# Patient Record
Sex: FEMALE | Employment: OTHER | URBAN - METROPOLITAN AREA
[De-identification: names, ages, dates, MRNs, and addresses within clinical notes are randomized per-mention and may not be internally consistent; named-entity substitution may affect disease eponyms.]

---

## 2020-01-06 ENCOUNTER — NEW PATIENT (OUTPATIENT)
Dept: URBAN - METROPOLITAN AREA CLINIC 11 | Facility: CLINIC | Age: 75
End: 2020-01-06

## 2020-01-06 ASSESSMENT — TONOMETRY
OD_IOP_MMHG: 16
OS_IOP_MMHG: 18

## 2020-01-06 ASSESSMENT — VISUAL ACUITY
OD_CC: 20/30-2
OS_CC: 20/25-1

## 2020-01-06 NOTE — PATIENT DISCUSSION
Mild edema seen today.  Will defer treatment now. Discussed the possibility of focal laser treatment in the right eye in the future.  Patient understands condition.

## 2020-06-25 ENCOUNTER — FOLLOW UP (OUTPATIENT)
Dept: URBAN - METROPOLITAN AREA CLINIC 11 | Facility: CLINIC | Age: 75
End: 2020-06-25

## 2020-06-25 ASSESSMENT — TONOMETRY
OS_IOP_MMHG: 16
OD_IOP_MMHG: 13

## 2020-06-25 ASSESSMENT — VISUAL ACUITY
OS_CC: 20/30-2
OD_CC: 20/30-2

## 2020-06-25 NOTE — PROCEDURE NOTE: CLINICAL
PROCEDURE NOTE: Focal Laser, Retina OD. Diagnosis: Diabetes, Type II with Ocular Complications. Anesthesia: Topical. Prior to focal laser, risks/benefits/alternatives to laser discussed including loss of vision and patient wished to proceed. Spot size: * um. Power: * mW. Number of pulses: *. Patient tolerated procedure well. There were no complications. Post procedure instructions given. Tran Borja

## 2022-06-21 NOTE — PATIENT DISCUSSION
IOP elevated slightly, but did not use med this am. Consider Vyzulta if IOP not adequate. Recommend using latanoprost in AM rather than PM for compliance improvement.

## 2022-07-03 RX ORDER — SIMVASTATIN 40 MG
TABLET ORAL
COMMUNITY

## 2022-07-03 RX ORDER — LISINOPRIL 5 MG/1
TABLET ORAL
COMMUNITY

## 2022-07-03 RX ORDER — OMEPRAZOLE 40 MG/1
1 CAPSULE, DELAYED RELEASE ORAL
COMMUNITY

## 2022-07-03 RX ORDER — CANAGLIFLOZIN 300 MG/1
TABLET, FILM COATED ORAL
COMMUNITY

## 2022-07-03 RX ORDER — GLIMEPIRIDE 4 MG/1
TABLET ORAL
COMMUNITY

## 2023-10-02 NOTE — PATIENT DISCUSSION
Minimal risk for malignant transformation discussed with patient. Recommend to observe and follow with serial exams. Wegovy 0.25mg/0.5ml Approved    08/30/2023 through 03/27/2024    Filling Pharmacy: [id number] - [facility] [address] AT [facility] [address]   [address] , [facility] [id number]   Phone:  [phone]      Notes: Please advise, thank you!